# Patient Record
Sex: FEMALE | Employment: OTHER | URBAN - METROPOLITAN AREA
[De-identification: names, ages, dates, MRNs, and addresses within clinical notes are randomized per-mention and may not be internally consistent; named-entity substitution may affect disease eponyms.]

---

## 2022-08-23 ENCOUNTER — TRANSFERRED RECORDS (OUTPATIENT)
Dept: HEALTH INFORMATION MANAGEMENT | Facility: CLINIC | Age: 87
End: 2022-08-23

## 2022-08-26 ENCOUNTER — NURSING HOME VISIT (OUTPATIENT)
Dept: GERIATRICS | Facility: CLINIC | Age: 87
End: 2022-08-26
Payer: MEDICARE

## 2022-08-26 DIAGNOSIS — N39.0 URINARY TRACT INFECTION WITHOUT HEMATURIA, SITE UNSPECIFIED: ICD-10-CM

## 2022-08-26 DIAGNOSIS — M62.82 NON-TRAUMATIC RHABDOMYOLYSIS: ICD-10-CM

## 2022-08-26 DIAGNOSIS — G30.9 ALZHEIMER'S DEMENTIA WITHOUT BEHAVIORAL DISTURBANCE, UNSPECIFIED TIMING OF DEMENTIA ONSET: Primary | ICD-10-CM

## 2022-08-26 DIAGNOSIS — F02.80 ALZHEIMER'S DEMENTIA WITHOUT BEHAVIORAL DISTURBANCE, UNSPECIFIED TIMING OF DEMENTIA ONSET: Primary | ICD-10-CM

## 2022-08-26 DIAGNOSIS — I73.9 PERIPHERAL ARTERIAL DISEASE (H): ICD-10-CM

## 2022-08-26 PROBLEM — I51.89 DIASTOLIC DYSFUNCTION: Status: ACTIVE | Noted: 2022-08-26

## 2022-08-26 PROBLEM — R53.1 GENERALIZED WEAKNESS: Status: ACTIVE | Noted: 2021-05-17

## 2022-08-26 PROBLEM — R32 URINARY INCONTINENCE: Status: ACTIVE | Noted: 2022-08-26

## 2022-08-26 PROBLEM — M47.816 SPONDYLOSIS OF LUMBAR SPINE: Status: ACTIVE | Noted: 2022-08-26

## 2022-08-26 PROBLEM — M19.90 OSTEOARTHRITIS: Status: ACTIVE | Noted: 2022-08-26

## 2022-08-26 PROBLEM — E78.5 HYPERLIPIDEMIA: Status: ACTIVE | Noted: 2022-08-26

## 2022-08-26 PROCEDURE — 99305 1ST NF CARE MODERATE MDM 35: CPT | Performed by: FAMILY MEDICINE

## 2022-08-26 NOTE — PROGRESS NOTES
[facility] GERIATRICS    PRIMARY CARE PROVIDER AND CLINIC:  Danial Bran MD, [address]  / [facility] [address]  No chief complaint on file.     [place] Medical Record Number:  [id number]  Place of Service where encounter took place:  [facility] AND REHAB ([facility]) [[id number]]    Juanita Pan  is a 89 year old  (10/29/1932), this is nursing home admission please see discharge summary below.  Patient was recently hospitalized with rhabdomyolysis and UTI.  She has dementia..   HPI:    Discharge Summaries  - documented in this encounter    Dee Loja MD - 08/22/2022 9:19 PM CDT  Formatting of this note is different from the original.  [facility] Discharge Summary Report     Admit Date/Time: 8/20/2022 11:48 AM   Discharge Date: 8/23/2022   Service: Hospital Medicine  Staff MD: Dee Loja MD    Discharge diagnoses:   Principal Problem:  Urinary tract infection with hematuria  Active Problems:  Generalized weakness  Non-traumatic rhabdomyolysis  Altered mental status  Elevated lactic acid level  Alzheimer's dementia (HRC)  Fall at home      Procedures:   * No surgery found *    Pending results:   None     Hospital Course:     Juanita Pan is an 89 year old woman with past medical history significant for dementia and diastolic heart failure who was admitted to [facility] on 8/20 with rhabdomyolysis and urinary tract infection.     # nontraumatic rhabdomyolysis: CK elevated at 2714 on admission down to 617.     # UTI with hematuria: Urine culture pending, leukocytosis could be 2/2 to stress response too of being down. Initial treatment with IV ceftriaxone x2 doses transition to cefpodoxime.Culture with greater than 100,000 multiple bacterial morphotypes.    # elevated LFT: likely 2/2 to above. Resolving     # adult failure to thrive:   # Dementia: SLUMS from 5/2021 was 12/30. Currently living alone. OT assessment => recommending TCU    # recurrent falls: PT/OT recommending  "TCU.     # elevated troponin: No chest pain - likely 2/2 to demand. Down to 0.09.     Exam on day of discharge:   /55 (BP Cuff Size: Regular)  Pulse 62  Temp 98.8  F (37.1  C) (Oral)  Resp 18  Ht 5' 5\" (1.651 m)  Wt 69.2 kg (152 lb 8 oz)  SpO2 95%  BMI 25.38 kg/m    Physical Exam  Constitutional:   Appearance: Normal appearance.   HENT:   Head: Normocephalic and atraumatic.   Eyes:   Extraocular Movements: Extraocular movements intact.   Cardiovascular:   Rate and Rhythm: Normal rate and regular rhythm.   Pulmonary:   Effort: Pulmonary effort is normal.   Breath sounds: Normal breath sounds.   Abdominal:   General: Abdomen is flat. Bowel sounds are normal.   Palpations: Abdomen is soft.   Skin:  General: Skin is warm and dry.   Neurological:   General: No focal deficit present.   Mental Status: She is alert.   Psychiatric:   Mood and Affect: Mood normal.     Code Status: Full     Discharge Medications:        CODE STATUS/ADVANCE DIRECTIVES DISCUSSION:  No Order  CPR/Full code   ALLERGIES: Not on File   PAST MEDICAL HISTORY: No past medical history on file.   PAST SURGICAL HISTORY:   has no past surgical history on file.  FAMILY HISTORY: family history is not on file.  SOCIAL HISTORY:     Patient's living condition: lives alone    Post Discharge Medication Reconciliation Status:   Post Medication Reconciliation Status:         No current outpatient medications on file.     No current facility-administered medications for this visit.       ROS:  4 point ROS including Respiratory, CV, GI and , other than that noted in the HPI,  is negative    Vitals:  There were no vitals taken for this visit.  Exam:  GENERAL APPEARANCE:  Alert, in no distress  EYES:  EOM, conjunctivae, lids, pupils and irises normal  RESP:  respiratory effort and palpation of chest normal, lungs clear to auscultation   CV:  Palpation and auscultation of heart done , regular rate and rhythm, no murmur, rub, or gallop  SKIN:  Inspection " of skin and subcutaneous tissue baseline  NEURO:   Cranial nerves 2-12 are normal tested and grossly at patient's baseline    Lab/Diagnostic data:      ASSESSMENT/PLAN:    (G30.9,  F02.80) Alzheimer's dementia without behavioral disturbance, unspecified timing of dementia onset (H)  (primary encounter diagnosis)  Comment: Slums of 12 continue to monitor  Plan:     (I73.9) Peripheral arterial disease (H)  Comment: Asymptomatic  Plan:     (N39.0) Urinary tract infection without hematuria, site unspecified  Comment: Treated  Plan:     (M62.82) Non-traumatic rhabdomyolysis  Comment: Improved  Plan: With PT OT              Total time spent with patient visit at the skilled nursing facility was 35 min including patient visit and review of past records. Greater than 50% of total time spent with counseling and coordinating care due to review of records.     Danial Bran MD on 8/26/2022 at 8:51 AM

## 2022-10-08 ENCOUNTER — TELEPHONE (OUTPATIENT)
Dept: FAMILY MEDICINE | Facility: CLINIC | Age: 87
End: 2022-10-08

## 2022-10-09 NOTE — TELEPHONE ENCOUNTER
Paged by [facility] at 218pm, returned call at 220pm and spoke to RENATO Hendrickson. Patient with UC with E coli, pan sensitive. No allergies. Will start bactrim ds po bid x 10 days.

## 2022-10-28 ENCOUNTER — NURSING HOME VISIT (OUTPATIENT)
Dept: GERIATRICS | Facility: CLINIC | Age: 87
End: 2022-10-28
Payer: MEDICARE

## 2022-10-28 DIAGNOSIS — G30.9 ALZHEIMER'S DEMENTIA WITHOUT BEHAVIORAL DISTURBANCE (H): Primary | ICD-10-CM

## 2022-10-28 DIAGNOSIS — F02.80 ALZHEIMER'S DEMENTIA WITHOUT BEHAVIORAL DISTURBANCE (H): Primary | ICD-10-CM

## 2022-10-28 PROCEDURE — 99309 SBSQ NF CARE MODERATE MDM 30: CPT | Performed by: FAMILY MEDICINE

## 2022-10-28 NOTE — PROGRESS NOTES
[facility] GERIATRICS  No chief complaint on file.    [place] Medical Record Number:  [id number]  Place of Service where encounter took place:  [facility] AND REHAB ([facility]) [[id number]]    HPI:    Juanita Pan  is 89 year old (10/29/1932), who is being seen today for a federally mandated E/M visit. Today's concerns are:  This is a 30-day mandatory follow-up.  Patient was admitted to the long-term care because of generalized weakness from UTI with a fall and rhabdomyolysis.  She has significant Alzheimer's dementia.  There has been no significant changes in her condition she did have a UTI 3 weeks ago    ALLERGIES:Patient has no allergy information on record.  PAST MEDICAL HISTORY: No past medical history on file.  PAST SURGICAL HISTORY:   has no past surgical history on file.  FAMILY HISTORY: family history is not on file.  SOCIAL HISTORY:      MEDICATIONS:  MED REC REQUIRED  Post Medication Reconciliation Status: Reviewed         Review of your medicines      as of October 28, 2022  8:49 AM     You have not been prescribed any medications.          Case Management:  I have reviewed the care plan and MDS and do agree with the plan. Patient's desire to return to the community is present, but is not able due to care needs . Information reviewed:  Medications, vital signs, orders, and nursing notes.    ROS:  4 point ROS including Respiratory, CV, GI and , other than that noted in the HPI,  is negative    Vitals:  There were no vitals taken for this visit.  There is no height or weight on file to calculate BMI.  Exam:  GENERAL APPEARANCE:  Alert, in no distress  RESP:  respiratory effort and palpation of chest normal, lungs clear to auscultation   CV:  Palpation and auscultation of heart done , regular rate and rhythm, no murmur, rub, or gallop  NEURO:   Cranial nerves 2-12 are normal tested and grossly at patient's baseline    Lab/Diagnostic data:       ASSESSMENT/PLAN  (G30.9,  F02.80) Alzheimer's dementia  without behavioral disturbance, unspecified timing of dementia onset  (primary encounter diagnosis)  Comment: Chronic and progressive  Plan: No change      Danial Bran MD on 10/28/2022 at 10:45 AM

## 2023-01-23 ENCOUNTER — NURSING HOME VISIT (OUTPATIENT)
Dept: GERIATRICS | Facility: CLINIC | Age: 88
End: 2023-01-23
Payer: MEDICARE

## 2023-01-23 DIAGNOSIS — F02.80 ALZHEIMER'S DEMENTIA WITHOUT BEHAVIORAL DISTURBANCE (H): Primary | ICD-10-CM

## 2023-01-23 DIAGNOSIS — R53.1 GENERALIZED WEAKNESS: ICD-10-CM

## 2023-01-23 DIAGNOSIS — G30.9 ALZHEIMER'S DEMENTIA WITHOUT BEHAVIORAL DISTURBANCE (H): Primary | ICD-10-CM

## 2023-01-23 PROBLEM — M25.559 ARTHRALGIA OF HIP: Status: ACTIVE | Noted: 2023-01-23

## 2023-01-23 PROCEDURE — 99308 SBSQ NF CARE LOW MDM 20: CPT | Performed by: FAMILY MEDICINE

## 2023-03-20 ENCOUNTER — NURSING HOME VISIT (OUTPATIENT)
Dept: GERIATRICS | Facility: CLINIC | Age: 88
End: 2023-03-20
Payer: MEDICARE

## 2023-03-20 DIAGNOSIS — I51.89 DIASTOLIC DYSFUNCTION: ICD-10-CM

## 2023-03-20 DIAGNOSIS — G30.9 ALZHEIMER'S DEMENTIA WITHOUT BEHAVIORAL DISTURBANCE (H): Primary | ICD-10-CM

## 2023-03-20 DIAGNOSIS — I73.9 PERIPHERAL ARTERIAL DISEASE (H): ICD-10-CM

## 2023-03-20 DIAGNOSIS — F02.80 ALZHEIMER'S DEMENTIA WITHOUT BEHAVIORAL DISTURBANCE (H): Primary | ICD-10-CM

## 2023-03-20 PROCEDURE — 99308 SBSQ NF CARE LOW MDM 20: CPT | Performed by: FAMILY MEDICINE

## 2023-03-20 NOTE — PROGRESS NOTES
[facility] GERIATRICS  CC:  Routine visit    [place] Medical Record Number:  [id number]  Place of Service where encounter took place:  [facility] AND REHAB ([facility]) [[id number]]    HPI:    Juanita Pan  is 89 year old (10/29/1932), who is being seen today for a federally mandated E/M visit. Today's concerns are:     Alzheimer's dementia without behavioral disturbance (H)  Peripheral arterial disease (H)  Diastolic dysfunction    ALLERGIES:Patient has no allergy information on record.  PAST MEDICAL HISTORY: No past medical history on file.  PAST SURGICAL HISTORY:   has no past surgical history on file.  FAMILY HISTORY: family history is not on file.  SOCIAL HISTORY:      MEDICATIONS:  MED REC REQUIRED  Post Medication Reconciliation Status: patient was not discharged from an inpatient facility or TCU         Review of your medicines      as of March 20, 2023  4:18 PM     You have not been prescribed any medications.          Case Management:  I have reviewed the care plan and MDS and do agree with the plan. Patient's desire to return to the community is not present. Information reviewed:  Medications, vital signs, orders, and nursing notes.    ROS:  Unobtainable secondary to cognitive impairment.     Vitals:  VSS  There is no height or weight on file to calculate BMI.  Exam:  GENERAL APPEARANCE:  Alert, in no distress  RESP:  no respiratory distress  CV:  rate-normal  PSYCH:  memory impaired     Lab/Diagnostic data:       ASSESSMENT/PLAN  1. Alzheimer's dementia without behavioral disturbance, unspecified timing of dementia onset    2. Peripheral arterial disease (H)    3. Diastolic dysfunction          Care plan, medications reviewed  No changes planned        Electronically signed by:  Gagan Carlson MD

## 2023-06-09 ENCOUNTER — NURSING HOME VISIT (OUTPATIENT)
Dept: GERIATRICS | Facility: CLINIC | Age: 88
End: 2023-06-09
Payer: MEDICARE

## 2023-06-09 DIAGNOSIS — G30.9 ALZHEIMER'S DEMENTIA WITHOUT BEHAVIORAL DISTURBANCE (H): Primary | ICD-10-CM

## 2023-06-09 DIAGNOSIS — F02.80 ALZHEIMER'S DEMENTIA WITHOUT BEHAVIORAL DISTURBANCE (H): Primary | ICD-10-CM

## 2023-06-09 PROCEDURE — 99308 SBSQ NF CARE LOW MDM 20: CPT | Performed by: FAMILY MEDICINE

## 2023-06-09 NOTE — PROGRESS NOTES
[facility] GERIATRICS  No chief complaint on file.    [place] Medical Record Number:  [id number]  Place of Service where encounter took place[facility] ([facility]) [[id number]]    HPI:    Juanita Pan  is 89 year old (10/29/1932), who is being seen today for a federally mandated E/M visit. Today's concerns are:  This is 60-day regulatory visit.  Patient has progressive Alzheimer's dementia there is no change in his condition.    ALLERGIES:Patient has no allergy information on record.  PAST MEDICAL HISTORY: No past medical history on file.  PAST SURGICAL HISTORY:   has no past surgical history on file.  FAMILY HISTORY: family history is not on file.  SOCIAL HISTORY:      MEDICATIONS:  MED REC REQUIRED  Post Medication Reconciliation Status:          Review of your medicines      as of June 9, 2023  7:14 AM     You have not been prescribed any medications.          Case Management:  I have reviewed the care plan and MDS and do agree with the plan. Patient's desire to return to the community is not present. Information reviewed:  Medications, vital signs, orders, and nursing notes.    ROS:  Unobtainable secondary to cognitive impairment.     Vitals:  There were no vitals taken for this visit.  There is no height or weight on file to calculate BMI.  Exam:  GENERAL APPEARANCE:  Alert, in no distress  NEURO:   Cranial nerves 2-12 are normal tested and grossly at patient's baseline    Lab/Diagnostic data:       ASSESSMENT/PLAN  (G30.9,  F02.80) Alzheimer's dementia without behavioral disturbance, unspecified timing of dementia onset  (primary encounter diagnosis)  Comment: Continue with routine cares  Plan:       Danial Bran MD on 6/9/2023 at 9:25 AM

## 2023-08-04 ENCOUNTER — NURSING HOME VISIT (OUTPATIENT)
Dept: GERIATRICS | Facility: CLINIC | Age: 88
End: 2023-08-04
Payer: MEDICARE

## 2023-08-04 DIAGNOSIS — F02.80 ALZHEIMER'S DEMENTIA WITHOUT BEHAVIORAL DISTURBANCE (H): Primary | ICD-10-CM

## 2023-08-04 DIAGNOSIS — G30.9 ALZHEIMER'S DEMENTIA WITHOUT BEHAVIORAL DISTURBANCE (H): Primary | ICD-10-CM

## 2023-08-04 PROCEDURE — 99308 SBSQ NF CARE LOW MDM 20: CPT | Performed by: FAMILY MEDICINE

## 2023-08-04 RX ORDER — ESCITALOPRAM OXALATE 5 MG/1
15 TABLET ORAL DAILY
COMMUNITY
Start: 2023-08-04

## 2023-08-04 NOTE — PROGRESS NOTES
[facility] GERIATRICS  No chief complaint on file.    [place] Medical Record Number:  [id number]  Place of Service where encounter took place:  [facility] AND REHAB ([facility]) [[id number]]    HPI:    Juanita Pan  is 89 year old (10/29/1932), who is being seen today for a federally mandated E/M visit. Today's concerns are:  No new concerns.    ALLERGIES:Patient has no allergy information on record.  PAST MEDICAL HISTORY: No past medical history on file.  PAST SURGICAL HISTORY:   has no past surgical history on file.  FAMILY HISTORY: family history is not on file.  SOCIAL HISTORY:      MEDICATIONS:  MED REC REQUIRED  Post Medication Reconciliation Status:          Review of your medicines            Accurate as of August 4, 2023  8:19 AM. If you have any questions, ask your nurse or doctor.                CONTINUE these medicines which have NOT CHANGED        Dose / Directions   escitalopram 5 MG tablet  Commonly known as: LEXAPRO      Dose: 15 mg  Take 3 tablets (15 mg) by mouth daily  Refills: 0               Case Management:  I have reviewed the care plan and MDS and do agree with the plan. Patient's desire to return to the community is not assessible due to cognitive impairment. Information reviewed:  Medications, vital signs, orders, and nursing notes.    ROS:  Unobtainable secondary to cognitive impairment.     Vitals:  There were no vitals taken for this visit.  There is no height or weight on file to calculate BMI.  Exam:  GENERAL APPEARANCE:  Alert, in no distress  NEURO:   Cranial nerves 2-12 are normal tested and grossly at patient's baseline    Lab/Diagnostic data:       ASSESSMENT/PLAN  (G30.9,  F02.80) Alzheimer's dementia without behavioral disturbance (H)  (primary encounter diagnosis)  Comment: No change in plan  Plan:         Danial Bran MD on 8/4/2023 at 8:20 AM

## 2023-10-12 NOTE — PROGRESS NOTES
[facility]  No chief complaint on file.    [place] Medical Record Number:  [id number]  Place of Service where encounter took place:  No question data found.    HPI:    Juanita Pan  is 89 year old (10/29/1932), who is being seen today for a federally mandated E/M visit. Today's concerns are:  {FGS DX:595755}    ALLERGIES:Patient has no allergy information on record.  PAST MEDICAL HISTORY: No past medical history on file.  PAST SURGICAL HISTORY:   has no past surgical history on file.  FAMILY HISTORY: family history is not on file.  SOCIAL HISTORY:      MEDICATIONS:  MED REC REQUIRED{TIP  Click the link below to document or use med rec list, use list to pull in response :405923}  Post Medication Reconciliation Status: {MED REC LIST:154127}         Review of your medicines            Accurate as of October 12, 2023  4:39 PM. If you have any questions, ask your nurse or doctor.                CONTINUE these medicines which have NOT CHANGED        Dose / Directions   escitalopram 5 MG tablet  Commonly known as: LEXAPRO      Dose: 15 mg  Take 3 tablets (15 mg) by mouth daily  Refills: 0           ***    Case Management:  I have reviewed the care plan and MDS and do agree with the plan. Patient's desire to return to the community is {FGS RETURN TO COMMUNITY:431071}. Information reviewed:  Medications, vital signs, orders, and nursing notes.    ROS:  {ROS FGS:788903}    Vitals:  There were no vitals taken for this visit.  There is no height or weight on file to calculate BMI.  Exam:  {snf physical exam :866455}    Lab/Diagnostic data:   {fgslab:452506}    ASSESSMENT/PLAN  {FGS DX2:336873}    {fgsorders:595615}  ***    {fgstime1:388496}    Electronically signed by:  ***

## 2023-10-13 ENCOUNTER — NURSING HOME VISIT (OUTPATIENT)
Dept: GERIATRICS | Facility: CLINIC | Age: 88
End: 2023-10-13
Payer: MEDICARE

## 2023-10-13 DIAGNOSIS — F02.80 ALZHEIMER'S DEMENTIA WITHOUT BEHAVIORAL DISTURBANCE (H): Primary | ICD-10-CM

## 2023-10-13 DIAGNOSIS — G30.9 ALZHEIMER'S DEMENTIA WITHOUT BEHAVIORAL DISTURBANCE (H): Primary | ICD-10-CM

## 2023-10-13 PROCEDURE — 99308 SBSQ NF CARE LOW MDM 20: CPT | Performed by: FAMILY MEDICINE

## 2023-10-13 NOTE — PROGRESS NOTES
[facility] GERIATRICS  No chief complaint on file.    [place] Medical Record Number:  [id number]  Place of Service where encounter took place:  [facility] REHAB ([facility]) [[id number]]    HPI:    Juanita Pan  is 89 year old (10/29/1932), who is being seen today for a federally mandated E/M visit. Today's concerns are:  This is 60-day regulatory visit there is been no changes in condition    ALLERGIES:Patient has no allergy information on record.  PAST MEDICAL HISTORY: No past medical history on file.  PAST SURGICAL HISTORY:   has no past surgical history on file.  FAMILY HISTORY: family history is not on file.  SOCIAL HISTORY:      MEDICATIONS:  MED REC REQUIRED  Post Medication Reconciliation Status:          Review of your medicines            Accurate as of October 13, 2023  7:17 AM. If you have any questions, ask your nurse or doctor.                CONTINUE these medicines which have NOT CHANGED        Dose / Directions   escitalopram 5 MG tablet  Commonly known as: LEXAPRO      Dose: 15 mg  Take 3 tablets (15 mg) by mouth daily  Refills: 0               Case Management:  I have reviewed the care plan and MDS and do agree with the plan. Patient's desire to return to the community is not present. Information reviewed:  Medications, vital signs, orders, and nursing notes.    ROS:  Unobtainable secondary to cognitive impairment.     Vitals:  There were no vitals taken for this visit.  There is no height or weight on file to calculate BMI.  Exam:  GENERAL APPEARANCE:  in no distress  NEURO:   Cranial nerves 2-12 are normal tested and grossly at patient's baseline    Lab/Diagnostic data:       ASSESSMENT/PLAN  (G30.9,  F02.80) Alzheimer's dementia without behavioral disturbance (H)  (primary encounter diagnosis)  Comment: End-stage  Plan:         Danial Bran MD on 10/13/2023

## 2024-01-15 ENCOUNTER — NURSING HOME VISIT (OUTPATIENT)
Dept: GERIATRICS | Facility: CLINIC | Age: 89
End: 2024-01-15
Payer: MEDICARE

## 2024-01-15 DIAGNOSIS — R53.1 GENERALIZED WEAKNESS: ICD-10-CM

## 2024-01-15 DIAGNOSIS — G30.9 ALZHEIMER'S DEMENTIA WITHOUT BEHAVIORAL DISTURBANCE (H): Primary | ICD-10-CM

## 2024-01-15 DIAGNOSIS — M62.82 NON-TRAUMATIC RHABDOMYOLYSIS: ICD-10-CM

## 2024-01-15 DIAGNOSIS — E78.2 MIXED HYPERLIPIDEMIA: ICD-10-CM

## 2024-01-15 DIAGNOSIS — F02.80 ALZHEIMER'S DEMENTIA WITHOUT BEHAVIORAL DISTURBANCE (H): Primary | ICD-10-CM

## 2024-01-15 PROCEDURE — 99308 SBSQ NF CARE LOW MDM 20: CPT | Performed by: FAMILY MEDICINE

## 2024-01-16 NOTE — PROGRESS NOTES
[facility] GERIATRICS    CC:  routine visit    HPI:  Juanita Pan is a 89 year old  (10/29/1932), who is being seen today for an episodic care visit at: [facility] AND REHAB ([facility]) [[id number]]. Today's concern is: routine visit    There are no new concerns from staff or resident    Allergies, and PMH/PSH reviewed in EPIC today.  REVIEW OF SYSTEMS:  Unobtainable secondary to cognitive impairment.     Objective:   There were no vitals taken for this visit.  GENERAL APPEARANCE:  in no distress  RESP:  no respiratory distress  CV:  rate-normal  PSYCH:  memory impaired         Assessment/Plan:  1. Alzheimer's dementia without behavioral disturbance, unspecified timing of dementia onset    2. Mixed hyperlipidemia    3. Non-traumatic rhabdomyolysis    4. Generalized weakness        Stable condition  Care plan, medications reviewed  No changes recommended    MED REC REQUIRED  Post Medication Reconciliation Status:         Orders:        Electronically signed by: Gagan Carlson MD

## 2024-03-14 ENCOUNTER — NURSING HOME VISIT (OUTPATIENT)
Dept: GERIATRICS | Facility: CLINIC | Age: 89
End: 2024-03-14
Payer: MEDICARE

## 2024-03-14 DIAGNOSIS — F02.80 ALZHEIMER'S DEMENTIA WITHOUT BEHAVIORAL DISTURBANCE (H): Primary | ICD-10-CM

## 2024-03-14 DIAGNOSIS — G30.9 ALZHEIMER'S DEMENTIA WITHOUT BEHAVIORAL DISTURBANCE (H): Primary | ICD-10-CM

## 2024-03-14 PROCEDURE — 99308 SBSQ NF CARE LOW MDM 20: CPT | Performed by: FAMILY MEDICINE

## 2024-03-14 NOTE — PROGRESS NOTES
[facility] GERIATRICS  No chief complaint on file.    [place] Medical Record Number:  [id number]  Place of Service where encounter took place:  [facility] AND REHAB ([facility]) [[id number]]    HPI:    Juanita Pan  is 89 year old (10/29/1932), who is being seen today for a federally mandated E/M visit. Today's concerns are:  Is regulatory 60-day nursing home visit.  There is no changes in patient's condition. Trouble with labial reddness    ALLERGIES:Patient has no allergy information on record.  PAST MEDICAL HISTORY: No past medical history on file.  PAST SURGICAL HISTORY:   has no past surgical history on file.  FAMILY HISTORY: family history is not on file.  SOCIAL HISTORY:      MEDICATIONS:  MED REC REQUIRED  Post Medication Reconciliation Status:          Review of your medicines            Accurate as of March 14, 2024  7:17 AM. If you have any questions, ask your nurse or doctor.                CONTINUE these medicines which have NOT CHANGED        Dose / Directions   escitalopram 5 MG tablet  Commonly known as: LEXAPRO      Dose: 15 mg  Take 3 tablets (15 mg) by mouth daily  Refills: 0               Case Management:  I have reviewed the care plan and MDS and do agree with the plan. Patient's desire to return to the community is not assessible due to cognitive impairment. Information reviewed:  Medications, vital signs, orders, and nursing notes.    ROS:  Unobtainable secondary to cognitive impairment.     Vitals:  There were no vitals taken for this visit.  There is no height or weight on file to calculate BMI.  Exam:  GENERAL APPEARANCE:  Alert, in no distress  Labia with mild swelling and reddness  Lab/Diagnostic data:       ASSESSMENT/PLAN      (G30.9,  F02.80) Alzheimer's dementia without behavioral disturbance, unspecified timing of dementia onset  (primary encounter diagnosis)  Comment: No change in plan or cares  Plan:   Treat labia with ketoconazole    Danial Bran MD on 3/14/2024

## 2024-04-01 ENCOUNTER — NURSING HOME VISIT (OUTPATIENT)
Dept: GERIATRICS | Facility: CLINIC | Age: 89
End: 2024-04-01
Payer: MEDICARE

## 2024-04-01 DIAGNOSIS — N90.4 LICHEN SCLEROSUS OF VULVA: Primary | ICD-10-CM

## 2024-04-01 PROCEDURE — 99308 SBSQ NF CARE LOW MDM 20: CPT | Performed by: FAMILY MEDICINE

## 2024-04-01 NOTE — PROGRESS NOTES
[facility] GERIATRICS    Chief complaint, labial irritation    HPI:  Juanita Pan is a 89 year old  (10/29/1932), who is being seen today for an episodic care visit at: [facility] ([facility]) [[id number]]. Today's concern is: Continued labial irritation.  This has not responded to topical antifungals or barrier creams.  Staff reports some improvement with triamcinolone cream    Allergies, and PMH/PSH reviewed in [facility] today.  REVIEW OF SYSTEMS:  Unobtainable secondary to cognitive impairment.     Objective:   There were no vitals taken for this visit.  GENERAL APPEARANCE:  somnolent  RESP:  no respiratory distress  CV:  rate-normal  PSYCH:  memory impaired         Assessment/Plan:  1. Lichen sclerosus of vulva    Trial of betamethasone cream 0.05% apply twice daily for the next 2 weeks    MED REC REQUIRED  Post Medication Reconciliation Status:         Orders:        Electronically signed by: Gagan Carlson MD \

## 2024-04-18 ENCOUNTER — NURSING HOME VISIT (OUTPATIENT)
Dept: GERIATRICS | Facility: CLINIC | Age: 89
End: 2024-04-18
Payer: MEDICARE

## 2024-04-18 DIAGNOSIS — G30.9 ALZHEIMER'S DEMENTIA WITHOUT BEHAVIORAL DISTURBANCE (H): ICD-10-CM

## 2024-04-18 DIAGNOSIS — N90.4 LICHEN SCLEROSUS OF VULVA: Primary | ICD-10-CM

## 2024-04-18 DIAGNOSIS — F02.80 ALZHEIMER'S DEMENTIA WITHOUT BEHAVIORAL DISTURBANCE (H): ICD-10-CM

## 2024-04-18 PROCEDURE — 99308 SBSQ NF CARE LOW MDM 20: CPT | Performed by: FAMILY MEDICINE

## 2024-04-18 NOTE — PROGRESS NOTES
[facility] GERIATRICS    No chief complaint on file.    HPI:  Juanita Pan is a 89 year old  (10/29/1932), who is being seen today for an episodic care visit at: [facility] AND REHAB ([facility]) [[id number]]. Today's concern is: Follow-up on labial rash    Allergies, and PMH/PSH reviewed in EPIC today.  REVIEW OF SYSTEMS:  Unobtainable secondary to cognitive impairment.     Objective:   There were no vitals taken for this visit.  GENERAL APPEARANCE:  in no distress  SKIN:  labial swelling improving      Assessment/Plan:  (N90.4) Lichen sclerosus of vulva  (primary encounter diagnosis)  Comment:   Plan: continue steroid cr    (G30.9,  F02.80) Alzheimer's dementia without behavioral disturbance, unspecified timing of dementia onset  Comment:   Plan:         MED REC REQUIRED  Post Medication Reconciliation Status:     Danial Bran MD on 4/18/2024

## 2024-06-17 ENCOUNTER — NURSING HOME VISIT (OUTPATIENT)
Dept: GERIATRICS | Facility: CLINIC | Age: 89
End: 2024-06-17
Payer: MEDICARE

## 2024-06-17 DIAGNOSIS — F02.80 ALZHEIMER'S DEMENTIA WITHOUT BEHAVIORAL DISTURBANCE (H): Primary | ICD-10-CM

## 2024-06-17 DIAGNOSIS — G30.9 ALZHEIMER'S DEMENTIA WITHOUT BEHAVIORAL DISTURBANCE (H): Primary | ICD-10-CM

## 2024-06-17 DIAGNOSIS — R53.1 GENERALIZED WEAKNESS: ICD-10-CM

## 2024-06-17 PROCEDURE — 99308 SBSQ NF CARE LOW MDM 20: CPT | Performed by: FAMILY MEDICINE

## 2024-06-17 NOTE — PROGRESS NOTES
[facility] GERIATRICS    [place] Medical Record Number:  [id number]  Place of Service where encounter took place:  [facility] AND REHAB ([facility]) [[id number]]    HPI:    Juanita Pan  is 89 year old (10/29/1932), who is being seen today for a federally mandated E/M visit. Today's concerns are:  1. Alzheimer's dementia without behavioral disturbance, unspecified timing of dementia onset    2. Generalized weakness        ALLERGIES:Patient has no allergy information on record.  PAST MEDICAL HISTORY: No past medical history on file.  PAST SURGICAL HISTORY:   has no past surgical history on file.  FAMILY HISTORY: family history is not on file.  SOCIAL HISTORY:      MEDICATIONS:  MED REC REQUIRED  Post Medication Reconciliation Status:            Review of your medicines            Accurate as of June 17, 2024  4:19 PM. If you have any questions, ask your nurse or doctor.                CONTINUE these medicines which have NOT CHANGED        Dose / Directions   escitalopram 5 MG tablet  Commonly known as: LEXAPRO      Dose: 15 mg  Take 3 tablets (15 mg) by mouth daily  Refills: 0               Case Management:  I have reviewed the care plan and MDS and do agree with the plan. Patient's desire to return to the community is not present. Information reviewed:  Medications, vital signs, orders, and nursing notes.    ROS:  Unobtainable secondary to cognitive impairment.     Vitals:  There were no vitals taken for this visit.  There is no height or weight on file to calculate BMI.  Exam:  GENERAL APPEARANCE:  in no distress  RESP:  no respiratory distress  CV:  rate-normal  PSYCH:  memory impaired     Lab/Diagnostic data:       ASSESSMENT/PLAN  1. Alzheimer's dementia without behavioral disturbance, unspecified timing of dementia onset    2. Generalized weakness      Stable condition  Chart reviewed  Continue with current care plan medication          Electronically signed by:  Gagan Carlson MD

## 2024-09-13 ENCOUNTER — NURSING HOME VISIT (OUTPATIENT)
Dept: GERIATRICS | Facility: CLINIC | Age: 89
End: 2024-09-13
Payer: MEDICARE

## 2024-09-13 DIAGNOSIS — F02.80 ALZHEIMER'S DEMENTIA WITHOUT BEHAVIORAL DISTURBANCE (H): Primary | ICD-10-CM

## 2024-09-13 DIAGNOSIS — G30.9 ALZHEIMER'S DEMENTIA WITHOUT BEHAVIORAL DISTURBANCE (H): Primary | ICD-10-CM

## 2024-09-13 PROCEDURE — 99308 SBSQ NF CARE LOW MDM 20: CPT | Performed by: FAMILY MEDICINE

## 2024-09-13 NOTE — PROGRESS NOTES
[facility] GERIATRICS  No chief complaint on file.    [place] Medical Record Number:  [id number]  Place of Service where encounter took place:  [facility] ([facility]) [[id number]]    HPI:    Juanita Pan  is 89 year old (10/29/1932), who is being seen today for a federally mandated E/M visit. Today's concerns are:  This is a regulatory 60-day nursing visit there is been no change in patient's condition as her care is    ALLERGIES:Patient has no allergy information on record.  PAST MEDICAL HISTORY: No past medical history on file.  PAST SURGICAL HISTORY:   has no past surgical history on file.  FAMILY HISTORY: family history is not on file.  SOCIAL HISTORY:      MEDICATIONS:  MED REC REQUIRED  Post Medication Reconciliation Status:          Review of your medicines            Accurate as of September 13, 2024  7:04 AM. If you have any questions, ask your nurse or doctor.                CONTINUE these medicines which have NOT CHANGED        Dose / Directions   escitalopram 5 MG tablet  Commonly known as: LEXAPRO      Dose: 15 mg  Take 3 tablets (15 mg) by mouth daily  Refills: 0             Patient Active Problem List   Diagnosis    Alzheimer's dementia without behavioral disturbance, unspecified timing of dementia onset    Peripheral arterial disease (H24)    Diastolic dysfunction    Generalized weakness    Hyperlipidemia    Non-traumatic rhabdomyolysis    Osteoarthritis    Spondylosis of lumbar spine    Urinary incontinence    Arthralgia of hip         Case Management:  I have reviewed the care plan and MDS and do agree with the plan. Patient's desire to return to the community is not assessible due to cognitive impairment. Information reviewed:  Medications, vital signs, orders, and nursing notes.    ROS:  Unobtainable secondary to cognitive impairment.     Vitals:  There were no vitals taken for this visit.  There is no height or weight on file to calculate BMI.  Exam:  GENERAL APPEARANCE:  in no  distress  PSYCH:  memory impaired     Lab/Diagnostic data:       ASSESSMENT/PLAN  (G30.9,  F02.80) Alzheimer's dementia without behavioral disturbance, unspecified timing of dementia onset  (primary encounter diagnosis)  Comment: No change in condition  Plan:     Danial Bran MD on 9/13/2024